# Patient Record
Sex: MALE | Race: WHITE | NOT HISPANIC OR LATINO | URBAN - METROPOLITAN AREA
[De-identification: names, ages, dates, MRNs, and addresses within clinical notes are randomized per-mention and may not be internally consistent; named-entity substitution may affect disease eponyms.]

---

## 2024-03-08 ENCOUNTER — TELEPHONE (OUTPATIENT)
Dept: OBGYN CLINIC | Facility: CLINIC | Age: 59
End: 2024-03-08

## 2024-03-08 NOTE — TELEPHONE ENCOUNTER
Asking the correct date of the injury (in the notes it says 1/4/23, is it 24?) Also his employers name and does he work full or part time

## 2024-03-08 NOTE — TELEPHONE ENCOUNTER
Caller: Patient    Doctor: Stanton    Reason for call: Returned call. Patient states the date of injury is correct 1/4/23. Employer-[employer]. Full time manager position. Patient will be receiving a cd from [facility] w/all medical information/images. Patient wanted to mention that he is a [employer] w/strict Quaker beliefs. The patient doesn't want to have additional imaging unless absolutely necesary    Call back#: [phone]

## 2024-03-14 ENCOUNTER — OFFICE VISIT (OUTPATIENT)
Dept: OBGYN CLINIC | Facility: CLINIC | Age: 59
End: 2024-03-14
Payer: OTHER MISCELLANEOUS

## 2024-03-14 VITALS
SYSTOLIC BLOOD PRESSURE: 132 MMHG | DIASTOLIC BLOOD PRESSURE: 77 MMHG | HEART RATE: 71 BPM | HEIGHT: 71 IN | WEIGHT: 172 LBS | BODY MASS INDEX: 24.08 KG/M2

## 2024-03-14 DIAGNOSIS — S83.412A SPRAIN OF MEDIAL COLLATERAL LIGAMENT OF LEFT KNEE, INITIAL ENCOUNTER: Primary | ICD-10-CM

## 2024-03-14 PROCEDURE — 99203 OFFICE O/P NEW LOW 30 MIN: CPT | Performed by: ORTHOPAEDIC SURGERY

## 2024-03-14 NOTE — PROGRESS NOTES
"Ortho Sports Medicine New Patient Visit     Assesment:   58 y.o. male with history of left MCL sprain    Plan:    Upon examination today, the patient's knee is very stable and I am not concerned for any persistent or acute internal knee derangement at this time. I believe the patient can return to full duty without any specific limitations at this time, which was provided in a note. We did discuss a referral to PT as the patient has been avoiding lower body workouts due to worsening his injury. Formal PT can work on regaining lower body strength in the setting of a suspected left MCL injury at work 14 months ago that is now well-healed. The patient can use ice/heat as needed for pain. He can use OTC medications as well, but prefers to avoid these and remain on an organic diet and treat conditions holistically. We will plan to follow up with Ward as needed.         Chief Complaint   Patient presents with    Left Knee - Pain       History of Present Illness:    The patient is a 58 y.o. male presenting for initial evaluation of traumatic left knee pain and swelling that occurred at work on 1/4/2023. The patient states he twisted the knee and had immediate swelling and difficulty bearing weight. The patient was initially evaluated in the ED where x-rays were obtained and they were concerned about an MCL injury. A U/S Duplex was also performed given his posterior knee pain and lower leg swelling, which was negative for DVT. The patient remains on an organic diet and avoids OTC medications, however, he did use 2 ibuprofen given the severe nature of his pain at the time of his injury. The patient has remained out of work for 14 months since his injury, stating that he did not receive any communication from his employer about treatment and he was only sent to [facility] within the last week for initial evaluation. In the meantime, the patient reports \"self-rehabbing\". He continues to go to the gym, but reports " "\"babying\" the knee and primarily doing upper body workouts. The patient did have braces he purchased, but has not used them consistently. He never formally attended PT. Today, the patient denies knee pain, recurrent swelling, instability, locking, weakness, and numbness/tingling. He reports a flare of pain occasionally with quick pivoting motions that last 2-3 days, but then spontaneously resolves. The patient states applying heat helps when flares occur.      Knee Surgical History:  None    Past Medical, Social and Family History:  History reviewed. No pertinent past medical history.  History reviewed. No pertinent surgical history.  Allergies   Allergen Reactions    Erythromycin Itching and Swelling    Gadolinium Derivatives Swelling     No current outpatient medications on file prior to visit.     No current facility-administered medications on file prior to visit.     Social History     Socioeconomic History    Marital status: Single     Spouse name: Not on file    Number of children: Not on file    Years of education: Not on file    Highest education level: Not on file   Occupational History    Not on file   Tobacco Use    Smoking status: Never    Smokeless tobacco: Never   Substance and Sexual Activity    Alcohol use: Yes     Comment: socially    Drug use: Never    Sexual activity: Not on file   Other Topics Concern    Not on file   Social History Narrative    Not on file     Social Determinants of Health     Financial Resource Strain: Not on file   Food Insecurity: Not on file   Transportation Needs: Not on file   Physical Activity: Not on file   Stress: Not on file   Social Connections: Not on file   Intimate Partner Violence: Not on file   Housing Stability: Not on file         I have reviewed the past medical, surgical, social and family history, medications and allergies as documented in the EMR.    Review of systems: ROS is negative other than that noted in the HPI.  Constitutional: Negative for fatigue " "and fever.   HENT: Negative for sore throat.    Respiratory: Negative for shortness of breath.    Cardiovascular: Negative for chest pain.   Gastrointestinal: Negative for abdominal pain.   Endocrine: Negative for cold intolerance and heat intolerance.   Genitourinary: Negative for flank pain.   Musculoskeletal: Negative for back pain.   Skin: Negative for rash.   Allergic/Immunologic: Negative for immunocompromised state.   Neurological: Negative for dizziness.   Psychiatric/Behavioral: Negative for agitation.      Physical Exam:    Blood pressure 132/77, pulse 71, height 5' 11\" (1.803 m), weight 78 kg (172 lb).    General/Constitutional: NAD, well developed, well nourished  HENT: Normocephalic, atraumatic  CV: Intact distal pulses, regular rate  Resp: No respiratory distress or labored breathing  Abdomen: soft, nondistended   Lymphatic: No lymphadenopathy palpated  Neuro: Alert and Oriented x 3, no focal deficits  Psych: Normal mood, normal affect  Skin: Warm, dry, no rashes, no erythema      Knee Exam:   No significant skin lesions or deformity  No joint effusion  Range of motion from 0 to 130 without pain  No joint line tenderness. No tenderness along the course of the MCL.  Knee is stable to varus stress, valgus stress, Lachman, and posterior drawer.    Neurovascularly intact distally    Knee Imaging    X-rays of the left knee reviewed from 1/12/23. These show no acute osseous abnormalities. Mild medical compartment degenerative changes. Chondrocalcinosis of diffusely throughout the meniscus. The patella is well-centered on the trochlea.        "

## 2024-03-21 ENCOUNTER — EVALUATION (OUTPATIENT)
Dept: PHYSICAL THERAPY | Facility: CLINIC | Age: 59
End: 2024-03-21
Payer: OTHER MISCELLANEOUS

## 2024-03-21 DIAGNOSIS — S83.412D SPRAIN OF MEDIAL COLLATERAL LIGAMENT OF LEFT KNEE, SUBSEQUENT ENCOUNTER: Primary | ICD-10-CM

## 2024-03-21 PROCEDURE — 97161 PT EVAL LOW COMPLEX 20 MIN: CPT | Performed by: PHYSICAL THERAPIST

## 2024-03-21 NOTE — PROGRESS NOTES
PT Evaluation     Today's date: 3/21/2024  Patient name: Ward Dior  : 1965  MRN: [id number]  Referring provider: Niyah Cody PA-C  Dx:   Encounter Diagnosis     ICD-10-CM    1. Sprain of medial collateral ligament of left knee, subsequent encounter  S83.412D Ambulatory Referral to Physical Therapy        Start Time: 1115  Stop Time: 1145  Total time in clinic (min): 30 minutes  Assessment  Assessment details: Ward Dior is a 58 y.o. male who presents with complaints of Sprain of medial collateral ligament of left knee, subsequent encounter  (primary encounter diagnosis).  No further referral appears necessary at this time based upon examination results.  Patient is presenting with self imposed activity limitations as he is unsure of returning fully to exercising for health. He has been cleared to return to work by referring provider. Patient educated to return to gym and trial normal exercise routine. Positive prognostic indicators include positive attitude toward recovery. Please contact me if you have any questions or recommendations.  Thank you for the opportunity to share in Ward's care.       Impairments: activity intolerance    Symptom irritability: lowUnderstanding of Dx/Px/POC: good  Plan  Plan details: Patient education provided.  Planned therapy interventions: balance and strengthening  Frequency: 1-2x.  Duration in weeks: 4  Treatment plan discussed with: patient      Short Term:  Pt will be able to independent in fitness program  in 4 weeks.  Pt will self report no functional limitations with exercising for health in 4 weeks.    Subjective Evaluation    History of Present Illness  Mechanism of injury: Patient reports that he got work boot wedged between 2 pallets. Had severe pain and was not able to weight-bear. Patient reports being told he had MCL tear. This happened 14 months ago. Patient reports ache that comes and goes when he twists slightly. Slight ache with stair  navigation. Used heating pad without improvement. Had swelling for lower leg for nearly a week. Has not tried jogging (self limited) and limited weight with lower body strengthening. Has not tried using weight with LE activity. Patient reports being hesitant to progress. Patient reports that he has not tried jogging or working for the past 14 months. Cleared recently to return to work. Initially had pain that caused him to not be able to put any weight on it.          Not a recurrent problem   Quality of life: good    Patient Goals  Patient goals for therapy: increased strength, independence with ADLs/IADLs and return to sport/leisure activities  Patient goal: no pain with exercising  Pain  Current pain ratin  At best pain ratin  At worst pain ratin  Progression: improved    Social Support  Steps to enter house: yes  Stairs in house: yes           Objective    GAIT: WNL  Squat assess: WNL  ¼ squat assess: WNL 5x  SLS: RLE: WNL, LLE: WNL           MMT    Hip         R          L   Flex. 5 5   Extn. 5 5   Abd. 5 5   Add.     IR. 5 5   ER. 5 5                 MMT         AROM    Knee      R          L         R          L   Flex. 5 5 WNL WNL   Extn. 5 5 WNL WNL                       MMT    Ankle         R          L   PF 5 5   DF. 5 5   DF bent knee 5 5   EHL 5 5   Inv. 5 5   Ever. 5 5       Palpation: No TTP    Knee: post drawer = - Lachman’s test=  - anterior draw test=   - Valgus stress test= - varus stress test = -  Magaly test =  -  apprehension test=  -  joint findings= WNL       Insurance:  AMA/CMS Eval/ Re-eval POC expires FOTO Auth #/ Referral # Total    Start date  Expiration date Extension  Visit limitation?  PT only or  PT+OT? Co-Insurance   AMA 3.21.24 4.18.24  Auth Needed                          AUTH #:  Date               Authed: Used                Remaining                  Precautions: standard  Patient provided verbal consent to treatment plan and recommended interventions.    Manuals  3.21        visit 1                                   Neuro Re-Ed                                                                        Ther Ex                                                                                 Ther Activity         Pt ed FB                 Gait Training                           Modalities